# Patient Record
Sex: MALE
[De-identification: names, ages, dates, MRNs, and addresses within clinical notes are randomized per-mention and may not be internally consistent; named-entity substitution may affect disease eponyms.]

---

## 2020-09-17 ENCOUNTER — HOSPITAL ENCOUNTER (OUTPATIENT)
Dept: HOSPITAL 53 - M CARPUL | Age: 30
End: 2020-09-17
Attending: NURSE PRACTITIONER
Payer: COMMERCIAL

## 2020-09-17 DIAGNOSIS — R05: Primary | ICD-10-CM

## 2020-09-17 NOTE — PFTRPT
Visit Date: 09/17/2020  Second ID: [id number]

Referring Doctor: Beth Boyer

Height: 66.00  Inches  Weight: 179.00  Lbs  BSA: 1.91

Diagnosis: R05



TECHNIQUE: Pre- and post-bronchodilator study of excellent technical quality. 



FINDINGS: 

Forced vital capacity is normal.  

FEV1 is in proportion of obstructive index; therefore, normal. 

Expiratory limit within the flow-volume loop raised a question of effort. 

Bronchodilator response is identified, but performance with regard to the 
maneuver is questioned. 

Total lung capacity normal. 

Residual volume is in proportion. 

Diffusing capacity is normal. 

Hemoglobin is acceptable at 15.2.  

Airway resistance and conductance are normal. 



IMPRESSION: 

Normal baseline parameters with question bronchodilator response. 

Further clinical correlation with the above will be necessary. 



MTDD

## 2020-09-24 ENCOUNTER — HOSPITAL ENCOUNTER (EMERGENCY)
Dept: HOSPITAL 53 - M ED | Age: 30
Discharge: HOME | End: 2020-09-24
Payer: COMMERCIAL

## 2020-09-24 ENCOUNTER — HOSPITAL ENCOUNTER (OUTPATIENT)
Dept: HOSPITAL 53 - M CARPUL | Age: 30
End: 2020-09-24
Attending: NURSE PRACTITIONER
Payer: COMMERCIAL

## 2020-09-24 VITALS — WEIGHT: 175.05 LBS | BODY MASS INDEX: 28.13 KG/M2 | HEIGHT: 66 IN

## 2020-09-24 VITALS — SYSTOLIC BLOOD PRESSURE: 143 MMHG | DIASTOLIC BLOOD PRESSURE: 74 MMHG

## 2020-09-24 DIAGNOSIS — K22.70: ICD-10-CM

## 2020-09-24 DIAGNOSIS — K21.9: ICD-10-CM

## 2020-09-24 DIAGNOSIS — R05: Primary | ICD-10-CM

## 2020-09-24 DIAGNOSIS — R07.89: Primary | ICD-10-CM

## 2020-09-24 PROCEDURE — 94070 EVALUATION OF WHEEZING: CPT

## 2020-09-24 PROCEDURE — 99284 EMERGENCY DEPT VISIT MOD MDM: CPT

## 2020-09-24 PROCEDURE — 94640 AIRWAY INHALATION TREATMENT: CPT

## 2020-09-24 NOTE — PFTRPT
Visit Date: 09/24/2020  Second ID: [id number]

Referring Doctor: Beth Boyer

Height: 66.00  Inches  Weight: 179.00  Lbs  BSA: 1.91

Diagnosis: R05



QUALITY: Study of excellent technical quality. 



PROCEDURE: Under protocol, methacholine was administered. At a dose of 10 mg, or
63.75 CDUs, a 26% decline in the FEV1 was noted. PC of 5.09 is significant. Flow
rates did return to baseline post-bronchodilator administration.  



IMPRESSION: Positive methacholine challenge study.



MTDD

## 2022-07-12 ENCOUNTER — HOSPITAL ENCOUNTER (OUTPATIENT)
Dept: HOSPITAL 53 - M PLAIMG | Age: 32
End: 2022-07-12
Attending: INTERNAL MEDICINE

## 2022-07-12 DIAGNOSIS — Z00.00: Primary | ICD-10-CM

## 2022-10-19 ENCOUNTER — HOSPITAL ENCOUNTER (OUTPATIENT)
Dept: HOSPITAL 53 - M RAD | Age: 32
End: 2022-10-19
Attending: NURSE PRACTITIONER
Payer: COMMERCIAL

## 2022-10-19 DIAGNOSIS — R05.9: Primary | ICD-10-CM

## 2022-12-10 ENCOUNTER — HOSPITAL ENCOUNTER (OUTPATIENT)
Dept: HOSPITAL 53 - M RAD | Age: 32
End: 2022-12-10
Attending: PHYSICIAN ASSISTANT
Payer: COMMERCIAL

## 2022-12-10 DIAGNOSIS — M25.561: ICD-10-CM

## 2022-12-10 DIAGNOSIS — M54.50: Primary | ICD-10-CM

## 2022-12-10 DIAGNOSIS — M25.562: ICD-10-CM

## 2023-04-22 ENCOUNTER — HOSPITAL ENCOUNTER (EMERGENCY)
Dept: HOSPITAL 53 - M ED | Age: 33
Discharge: HOME | End: 2023-04-22
Payer: COMMERCIAL

## 2023-04-22 VITALS
SYSTOLIC BLOOD PRESSURE: 180 MMHG | BODY MASS INDEX: 32.14 KG/M2 | DIASTOLIC BLOOD PRESSURE: 90 MMHG | HEIGHT: 66 IN | WEIGHT: 199.96 LBS

## 2023-04-22 DIAGNOSIS — L03.316: Primary | ICD-10-CM

## 2023-04-22 LAB
BASOPHILS # BLD AUTO: 0 10^3/UL (ref 0–0.2)
BASOPHILS NFR BLD AUTO: 0.4 % (ref 0–1)
BUN SERPL-MCNC: 16 MG/DL (ref 9–23)
CALCIUM SERPL-MCNC: 8.1 MG/DL (ref 8.5–10.1)
CHLORIDE SERPL-SCNC: 109 MMOL/L (ref 98–107)
CO2 SERPL-SCNC: 24 MMOL/L (ref 20–31)
CREAT SERPL-MCNC: 0.93 MG/DL (ref 0.7–1.3)
EOSINOPHIL # BLD AUTO: 0 10^3/UL (ref 0–0.5)
EOSINOPHIL NFR BLD AUTO: 0.4 % (ref 0–3)
GFR SERPL CREATININE-BSD FRML MDRD: > 60 ML/MIN/{1.73_M2} (ref 60–?)
GLUCOSE SERPL-MCNC: 94 MG/DL (ref 60–100)
HCT VFR BLD AUTO: 42.1 % (ref 42–52)
HGB BLD-MCNC: 14.7 G/DL (ref 13.5–17.5)
LYMPHOCYTES # BLD AUTO: 2 10^3/UL (ref 1.5–5)
LYMPHOCYTES NFR BLD AUTO: 29.7 % (ref 24–44)
MCH RBC QN AUTO: 30.9 PG (ref 27–33)
MCHC RBC AUTO-ENTMCNC: 34.9 G/DL (ref 32–36.5)
MCV RBC AUTO: 88.4 FL (ref 80–96)
MONOCYTES # BLD AUTO: 0.6 10^3/UL (ref 0–0.8)
MONOCYTES NFR BLD AUTO: 8.5 % (ref 2–8)
NEUTROPHILS # BLD AUTO: 4.2 10^3/UL (ref 1.5–8.5)
NEUTROPHILS NFR BLD AUTO: 60.7 % (ref 36–66)
PLATELET # BLD AUTO: 229 10^3/UL (ref 150–450)
POTASSIUM SERPL-SCNC: 5 MMOL/L (ref 3.5–5.1)
RBC # BLD AUTO: 4.76 10^6/UL (ref 4.3–6.1)
SODIUM SERPL-SCNC: 139 MMOL/L (ref 136–145)
WBC # BLD AUTO: 6.8 10^3/UL (ref 4–10)